# Patient Record
Sex: MALE | Race: WHITE | Employment: FULL TIME | URBAN - METROPOLITAN AREA
[De-identification: names, ages, dates, MRNs, and addresses within clinical notes are randomized per-mention and may not be internally consistent; named-entity substitution may affect disease eponyms.]

---

## 2022-02-16 NOTE — PROGRESS NOTES
HPI: Sarai Shearer (: 1975) is a 51 y.o. male, patient, here for evaluation of the following chief complaint(s):    Finger Pain (Left small finger nodule at the proximal phalanx )  Patient is seen today to evaluate his left hand. He has developed recurrent Dupuytren contracture in his left small finger. He had a palmar digital fascie[place] 3 to [place] ago. He has slowly developed recurrence and now has thickened nodule and cord formation to the ulnar aspect of the left small finger that is tenting the skin and resulted in the 45 degree PIP contracture. He has a positive tabletop test.  There really has not been any additional evidence of Dupuytren in his hand. He reportedly had an MRI performed last summer. X-rays previously obtained have not shown any bone abnormality. He complains of difficulty with functional use of his left hand and is seen for further treatment. Vitals:  Ht 5' 7\" (1.702 m)   Wt 130 lb (59 kg)   BMI 20.36 kg/m²    Body mass index is 20.36 kg/m². No Known Allergies    No current outpatient medications on file. No current facility-administered medications for this visit. History reviewed. No pertinent past medical history. History reviewed. No pertinent surgical history. History reviewed. No pertinent family history. Social History     Tobacco Use    Smoking status: Not on file    Smokeless tobacco: Not on file   Substance Use Topics    Alcohol use: Not on file    Drug use: Not on file        Review of Systems   All other systems reviewed and are negative.       ROS     Positive for: Musculoskeletal    Last edited by Ej Diaz on 2022  8:16 AM. (History)             Physical Exam    The left small finger has a PIP flexion contracture 45 degrees there is thickened pretendinous cord and nodule formation to the ulnar side of the small finger with the beginning in the palm but mostly at the proximal phalanx level crossing the PIP crease. There is a healed zigzag scar from his prior surgery when [place] was [place]. No other hand involvement. Examination of the left wrist reveals no swelling, edema or warmth, no tenderness to palpation, no pain, normal strength and tone, normal range of motion, no crepitus, normal sensation, no instability or laxity and no known fractures or deformities. Examination of the right wrist reveals no swelling, edema or warmth, no tenderness to palpation, no pain, normal strength and tone, normal range of motion, no crepitus, normal sensation, no instability or laxity and no known fractures or deformities. Examination of the right hand reveals no tenderness to palpation, no pain, normal  strength, normal tone, normal range of motion, no crepitus, no instability or laxity, no known fractures or deformities and normal sensation. Imaging:    MRI Results (most recent):  No results found for this or any previous visit. XR Results (most recent):  No results found for this or any previous visit. ASSESSMENT/PLAN:  Below is the assessment and plan developed based on review of pertinent history, physical exam, labs, studies, and medications. Patient's examination was consistent with a left small finger PIP contracture from Dupuytren cord and nodule formation more on the ulnar than radial side. This had been [place] about 3 to 5 years ago. He has developed recurrence to the ulnar side with a 45 degree flexion contracture. I spoke to him regarding treatment options and answered his questions. He was offered but deferred collagenase injection therapy and prefers to undergo revision small finger palmar digital fasciectomy with PIP joint release. I reviewed risks that include but not limited to stiffness, pain, nerve or tendon damage and incomplete complete recovery with continued contracture and recurrence.   Arrangements can be made for this to be performed on outpatient basis at his convenience. 1. Left hand pain  2. Dupuytren's contracture of left hand      Return for Follow-up 7 to 10 days after surgery. .    An electronic signature was used to authenticate this note.   -- Mai Rapp MD

## 2022-02-17 ENCOUNTER — OFFICE VISIT (OUTPATIENT)
Dept: ORTHOPEDIC SURGERY | Age: 47
End: 2022-02-17
Payer: COMMERCIAL

## 2022-02-17 VITALS — BODY MASS INDEX: 20.4 KG/M2 | WEIGHT: 130 LBS | HEIGHT: 67 IN

## 2022-02-17 DIAGNOSIS — M72.0 DUPUYTREN'S CONTRACTURE OF LEFT HAND: ICD-10-CM

## 2022-02-17 DIAGNOSIS — M79.642 LEFT HAND PAIN: Primary | ICD-10-CM

## 2022-02-17 PROCEDURE — 99203 OFFICE O/P NEW LOW 30 MIN: CPT | Performed by: ORTHOPAEDIC SURGERY

## 2022-02-17 NOTE — PATIENT INSTRUCTIONS
Dupuytren's Contracture: Care Instructions  Your Care Instructions     In Dupuytren's contracture, the fingers become stiff and curl toward the palm. It is caused by thick tissue that grows under the skin in the palm of the hand. Sometimes the condition affects the palm but not the fingers. If the tissue gets thicker and affects one or more fingers, it may limit movement of your fingers and hand. Sometimes the condition can occur in the soles of the feet. The cause of Dupuytren's disease is not known. Dupuytren's disease may get worse slowly. If you have mild Dupuytren's disease, you may be able to keep your fingers moving with regular stretching. Surgery usually helps in severe cases. However, Dupuytren's disease can come back. Follow-up care is a key part of your treatment and safety. Be sure to make and go to all appointments, and call your doctor if you are having problems. It's also a good idea to know your test results and keep a list of the medicines you take. How can you care for yourself at home? · Follow your doctor's advice for physical or occupational therapy and exercises to put your fingers and hand through a range of motion. · Two times a day, massage your hand and gently stretch the fingers back. This can get rid of tightness and help keep your fingers flexible. · Try to avoid curling your hand tightly. For example, use utensils and tools that have larger hand . When should you call for help? Call your doctor now or seek immediate medical care if:    · You have numbness in your fingers.     · You have a wound or sore on your finger or palm.     · Your hand or fingers get worse. Watch closely for changes in your health, and be sure to contact your doctor if you have any problems. Where can you learn more? Go to http://www.gray.com/  Enter T888 in the search box to learn more about \"Dupuytren's Contracture: Care Instructions. \"  Current as of: July 1, 2021               Content Version: 13.0  © 2584-5065 [employer]. Care instructions adapted under license by [employer] (which disclaims liability or warranty for this information). If you have questions about a medical condition or this instruction, always ask your healthcare professional. [address] any warranty or liability for your use of this information.

## 2022-02-21 DIAGNOSIS — M72.0 DUPUYTREN'S CONTRACTURE OF LEFT HAND: Primary | ICD-10-CM

## 2022-03-25 DIAGNOSIS — M72.0 DUPUYTREN'S CONTRACTURE OF LEFT HAND: Primary | ICD-10-CM

## 2022-03-25 RX ORDER — OXYCODONE AND ACETAMINOPHEN 5; 325 MG/1; MG/1
1 TABLET ORAL
Qty: 15 TABLET | Refills: 0 | Status: SHIPPED | OUTPATIENT
Start: 2022-03-25 | End: 2022-03-28

## 2022-04-05 NOTE — PROGRESS NOTES
HPI: Carolynn Guerrero (: 1975) is a 51 y.o. male, patient, here for evaluation of the following chief complaint(s):    Finger Pain (Left pinky feel better , somtimes gets shooting pain when sleeping , sometimes achy)  Patient is seen today to evaluate his left hand. He has developed recurrent Dupuytren contracture in his left small finger. He had a palmar digital fasciectomy performed when [place] was [place] 3 to 5 [place] ago. He has slowly developed recurrence and now has thickened nodule and cord formation to the ulnar aspect of the left small finger that is tenting the skin and resulted in the 45 degree PIP contracture. He has a positive tabletop test.  There really has not been any additional evidence of Dupuytren in his hand. He reportedly had an MRI performed last summer. X-rays previously obtained have not shown any bone abnormality. He complains of difficulty with functional use of his left hand. He underwent an left small finger palmar digital fasciectomy with PIP joint release including neurolysis repair of a chronic ulnar digital nerve injury on 3/28/2022. Vitals:  Ht 5' 7\" (1.702 m)   Wt 130 lb (59 kg)   BMI 20.36 kg/m²    Body mass index is 20.36 kg/m². No Known Allergies    No current outpatient medications on file. No current facility-administered medications for this visit. History reviewed. No pertinent past medical history. History reviewed. No pertinent surgical history. Family History   Problem Relation Age of Onset    Cancer Father         Social History     Tobacco Use    Smoking status: Former Smoker    Smokeless tobacco: Never Used   Substance Use Topics    Alcohol use: Never    Drug use: Never        Review of Systems   All other systems reviewed and are negative.              Physical Exam    The left small finger previously had a PIP flexion contracture 45 degrees there was thickened pretendinous cord and nodule formation to the ulnar side of the small finger with the beginning in the palm but mostly at the proximal phalanx level crossing the PIP crease. There is a healed zigzag scar from his prior surgery when he was [place]. No other hand involvement. The wound is healing well at this stage with no redness drainage or sign of infection. Marked improvement and near complete PIP extension with sutures in place. Even has some intact sensation ulnarly. Imaging:    MRI Results (most recent):  No results found for this or any previous visit. XR Results (most recent):  No results found for this or any previous visit. ASSESSMENT/PLAN:  Below is the assessment and plan developed based on review of pertinent history, physical exam, labs, studies, and medications. Patient's examination was consistent with a left small finger PIP contracture from Dupuytren cord and nodule formation more on the ulnar than radial side. This had been removed [place] about 3 to 5 years ago. He had developed recurrence to the ulnar side with a 45 degree flexion contracture. I spoke to him regarding treatment options and answered his questions. He was offered but deferred collagenase injection therapy and prefers to undergo revision small finger palmar digital fasciectomy with PIP joint release. He underwent the revision small finger palmar digital fasciectomy including PIP joint release and repair of a chronic ulnar digital nerve injury on 3/28/2022. He may continue with simple wound care, gentle motion and strength. A new splint was fabricated to hold the PIP joint in full extension. Overall he is doing well. I recommended nighttime extension splinting of the PIP joint and daytime motion recovery. Follow-up in 1-2 weeks for suture removal.      1. Dupuytren's contracture of left hand  -     [place] APPLY FINGER SPLINT,STATIC  -     FINGER SPLINT, STATIC  2. Left hand pain  3. Status post Dupuytren's fasciectomy      No follow-ups on file.     An electronic signature was used to authenticate this note.   -- Roberto Stacy MD

## 2022-04-06 ENCOUNTER — OFFICE VISIT (OUTPATIENT)
Dept: ORTHOPEDIC SURGERY | Age: 47
End: 2022-04-06
Payer: COMMERCIAL

## 2022-04-06 VITALS — HEIGHT: 67 IN | WEIGHT: 130 LBS | BODY MASS INDEX: 20.4 KG/M2

## 2022-04-06 DIAGNOSIS — M72.0 DUPUYTREN'S CONTRACTURE OF LEFT HAND: Primary | ICD-10-CM

## 2022-04-06 DIAGNOSIS — Z98.890 STATUS POST DUPUYTREN'S FASCIECTOMY: ICD-10-CM

## 2022-04-06 DIAGNOSIS — M79.642 LEFT HAND PAIN: ICD-10-CM

## 2022-04-06 PROCEDURE — 29130 APPL FINGER SPLINT STATIC: CPT | Performed by: ORTHOPAEDIC SURGERY

## 2022-04-06 PROCEDURE — 99024 POSTOP FOLLOW-UP VISIT: CPT | Performed by: ORTHOPAEDIC SURGERY

## 2022-04-18 NOTE — PROGRESS NOTES
HPI: Mayela Bernal (: 1975) is a 51 y.o. male, patient, here for evaluation of the following chief complaint(s):    No chief complaint on file. Patient is seen today to evaluate his left hand. He has developed recurrent Dupuytren contracture in his left small finger. He had a palmar digital fasciectomy performed when he was [place] 3 to 5 years ago. He has slowly developed recurrence and now has thickened nodule and cord formation to the ulnar aspect of the left small finger that is tenting the skin and resulted in the 45 degree PIP contracture. He has a positive tabletop test.  There really has not been any additional evidence of Dupuytren in his hand. He reportedly had an MRI performed last summer. X-rays previously obtained have not shown any bone abnormality. He complains of difficulty with functional use of his left hand. He underwent an left small finger palmar digital fasciectomy with PIP joint release including neurolysis repair of a chronic ulnar digital nerve injury on 3/28/2022. Vitals: There were no vitals taken for this visit. There is no height or weight on file to calculate BMI. No Known Allergies    No current outpatient medications on file. No current facility-administered medications for this visit. No past medical history on file. No past surgical history on file. Family History   Problem Relation Age of Onset    Cancer Father         Social History     Tobacco Use    Smoking status: Former Smoker    Smokeless tobacco: Never Used   Substance Use Topics    Alcohol use: Never    Drug use: Never        Review of Systems   All other systems reviewed and are negative.              Physical Exam    The left small finger previously had a PIP flexion contracture 45 degrees there was thickened pretendinous cord and nodule formation to the ulnar side of the small finger with the beginning in the palm but mostly at the proximal phalanx level crossing the PIP crease. There is a healed zigzag scar from his prior surgery when [place] was [place]. No other hand involvement. The wound is healing well at this stage with no redness drainage or sign of infection. Marked improvement and near complete PIP extension. Even has some intact sensation ulnarly. Imaging:    MRI Results (most recent):  No results found for this or any previous visit. XR Results (most recent):  No results found for this or any previous visit. ASSESSMENT/PLAN:  Below is the assessment and plan developed based on review of pertinent history, physical exam, labs, studies, and medications. Patient's examination was consistent with a left small finger PIP contracture from Dupuytren cord and nodule formation more on the ulnar than radial side. This had been [place] about 3 to 5 years ago. He had developed recurrence to the ulnar side with a 45 degree flexion contracture. I spoke to him regarding treatment options and answered his questions. He was offered but deferred collagenase injection therapy and prefers to undergo revision small finger palmar digital fasciectomy with PIP joint release. He underwent the revision small finger palmar digital fasciectomy including PIP joint release and repair of a chronic ulnar digital nerve injury on 3/28/2022. He may continue with simple wound care, gentle motion and strength. He continues to wear his nighttime extension splint. Sutures were removed on 4/19/2022. He may continue to work with hand motion and strength and is already within 5 degrees of full perfect PIP extension. He is pleased with his recovery and will continue with passive exercises and nighttime splinting. Follow-up in 4 to 6 weeks likely for final visit. 1. Dupuytren's contracture of left hand  2. Status post Dupuytren's fasciectomy  3. Left hand pain      No follow-ups on file. An electronic signature was used to authenticate this note.   -- Jose Carlos Lin MD

## 2022-04-19 ENCOUNTER — OFFICE VISIT (OUTPATIENT)
Dept: ORTHOPEDIC SURGERY | Age: 47
End: 2022-04-19
Payer: COMMERCIAL

## 2022-04-19 DIAGNOSIS — Z98.890 STATUS POST DUPUYTREN'S FASCIECTOMY: ICD-10-CM

## 2022-04-19 DIAGNOSIS — M72.0 DUPUYTREN'S CONTRACTURE OF LEFT HAND: Primary | ICD-10-CM

## 2022-04-19 DIAGNOSIS — M79.642 LEFT HAND PAIN: ICD-10-CM

## 2022-04-19 PROCEDURE — 99024 POSTOP FOLLOW-UP VISIT: CPT | Performed by: ORTHOPAEDIC SURGERY

## 2022-04-19 NOTE — LETTER
4/19/2022    Patient: Ava Rodrigues   YOB: 1975   Date of Visit: 4/19/2022     Love Rincon MD  [facility] [address]  Via Fax: [phone]    Dear Love Rincon MD,      Thank you for referring Mr. Ava Rodrigues to [facility] for evaluation. My notes for this consultation are attached. If you have questions, please do not hesitate to call me. I look forward to following your patient along with you.       Sincerely,    Zhou Hess MD

## 2022-05-23 NOTE — PROGRESS NOTES
HPI: Alvena Shone (: 1975) is a 51 y.o. male, patient, here for evaluation of the following chief complaint(s):    Surgical Follow-up (Left 5th finger palmar digital fasciectomy on 3/28/22.)  Patient is seen today to evaluate his left hand. He has developed recurrent Dupuytren contracture in his left small finger. He had a palmar digital fasciectomy performed when [place] was [place] 3 to 5 [place] ago. He has slowly developed recurrence and now has thickened nodule and cord formation to the ulnar aspect of the left small finger that is tenting the skin and resulted in the 45 degree PIP contracture. He has a positive tabletop test.  There really has not been any additional evidence of Dupuytren in his hand. He reportedly had an MRI performed last summer. X-rays previously obtained have not shown any bone abnormality. He complains of difficulty with functional use of his left hand. He underwent an left small finger palmar digital fasciectomy with PIP joint release including neurolysis repair of a chronic ulnar digital nerve injury on 3/28/2022. Vitals: There were no vitals taken for this visit. There is no height or weight on file to calculate BMI. No Known Allergies    Current Outpatient Medications   Medication Sig    methylPREDNISolone (MEDROL DOSEPACK) 4 mg tablet Per dose pack instructions     No current facility-administered medications for this visit. History reviewed. No pertinent past medical history. History reviewed. No pertinent surgical history. Family History   Problem Relation Age of Onset    Cancer Father         Social History     Tobacco Use    Smoking status: Former Smoker    Smokeless tobacco: Never Used   Substance Use Topics    Alcohol use: Never    Drug use: Never        Review of Systems   All other systems reviewed and are negative.       ROS     Positive for: Musculoskeletal    Last edited by Chapis Vogel on 2022  4:20 PM. (History) [employer] gold referral issued for Dr. Betancur, expires 09/28/2020.  This was completed by shaylee   Physical Exam    The left small finger previously had a PIP flexion contracture 45 degrees there was thickened pretendinous cord and nodule formation to the ulnar side of the small finger with the beginning in the palm but mostly at the proximal phalanx level crossing the PIP crease. There is a healed zigzag scar from his prior surgery when [place] was [place]. No other hand involvement. The wound is healing well at this stage with no redness drainage or sign of infection. Marked improvement and near complete PIP extension was noted initially but he is now lacking about 20 degrees of extension but nearly can be achieved passively. .  Even has some intact sensation ulnarly. Imaging:    MRI Results (most recent):  No results found for this or any previous visit. XR Results (most recent):  No results found for this or any previous visit. ASSESSMENT/PLAN:  Below is the assessment and plan developed based on review of pertinent history, physical exam, labs, studies, and medications. Patient's examination was consistent with a left small finger PIP contracture from Dupuytren cord and nodule formation more on the ulnar than radial side. [place] had [place] about 3 to 5 years ago. He had developed recurrence to the ulnar side with a 45 degree flexion contracture. I spoke to him regarding treatment options and answered his questions. He was offered but deferred collagenase injection therapy and prefers to undergo revision small finger palmar digital fasciectomy with PIP joint release. He underwent the revision small finger palmar digital fasciectomy including PIP joint release and repair of a chronic ulnar digital nerve injury on 3/28/2022. He may continue with simple wound care, gentle motion and strength. He continues to wear his nighttime extension splint. Sutures were removed on 4/19/2022.   He may continue to work with hand motion and strength and postoperatively was within 5 degrees of full perfect PIP extension. He is now within 20 degrees of full extension. I recommended a Medrol Dosepak and outpatient therapy to assist with nighttime extension splinting and even a spring splint. I will see him back in 3 to 4 weeks to check his progress. He may also work with active active assisted and passive motion exercises. 1. Dupuytren's contracture of left hand  -     methylPREDNISolone (MEDROL DOSEPACK) 4 mg tablet; Per dose pack instructions, Normal, Disp-1 Dose Pack, R-0  -     REFERRAL TO OCCUPATIONAL THERAPY  2. Status post Dupuytren's fasciectomy  3. Left hand pain      Return in about 4 weeks (around 6/21/2022). An electronic signature was used to authenticate this note.   -- Pia Subramanian MD

## 2022-05-24 ENCOUNTER — OFFICE VISIT (OUTPATIENT)
Dept: ORTHOPEDIC SURGERY | Age: 47
End: 2022-05-24
Payer: COMMERCIAL

## 2022-05-24 DIAGNOSIS — M79.642 LEFT HAND PAIN: ICD-10-CM

## 2022-05-24 DIAGNOSIS — M72.0 DUPUYTREN'S CONTRACTURE OF LEFT HAND: Primary | ICD-10-CM

## 2022-05-24 DIAGNOSIS — Z98.890 STATUS POST DUPUYTREN'S FASCIECTOMY: ICD-10-CM

## 2022-05-24 PROCEDURE — 99024 POSTOP FOLLOW-UP VISIT: CPT | Performed by: ORTHOPAEDIC SURGERY

## 2022-05-24 RX ORDER — METHYLPREDNISOLONE 4 MG/1
TABLET ORAL
Qty: 1 DOSE PACK | Refills: 0 | Status: SHIPPED | OUTPATIENT
Start: 2022-05-24

## 2022-05-24 NOTE — LETTER
5/24/2022    Patient: Miah Luna   YOB: 1975   Date of Visit: 5/24/2022     Annabel Bamberger, MD  [address] [place] [id number]  Via Fax: [phone]    Dear Annabel Bamberger, MD,      Thank you for referring Mr. Miah Luna to [facility] for evaluation. My notes for this consultation are attached. If you have questions, please do not hesitate to call me. I look forward to following your patient along with you.       Sincerely,    Jonathan Bender MD

## 2023-04-05 NOTE — PROGRESS NOTES
HPI: Chadd Hairston (: 1975) is a 49 y.o. male, patient, here for evaluation of the following chief complaint(s):    No chief complaint on file. Patient is seen today to evaluate his left hand. He has developed recurrent Dupuytren contracture in his left small finger. He had [place] palmar digital fascie[place]. He has slowly developed recurrence and now has thickened nodule and cord formation to the ulnar aspect of the left small finger that is tenting the skin and resulted in the 45 degree PIP contracture. He has a positive tabletop test.  There really has not been any additional evidence of Dupuytren in his hand. He reportedly had an MRI performed last summer. X-rays previously obtained have not shown any bone abnormality. He complains of difficulty with functional use of his left hand. He underwent an left small finger palmar digital fasciectomy with PIP joint release including neurolysis repair of a chronic ulnar digital nerve injury on 3/28/2022. Patient was reevaluated on 2023. He has been able to maintain the extension of his left small finger. He continues to wear the splint at night and during the day will wear a boutonniere splint 2-3 times during the day. He is within about 5 degrees of full PIP extension. Proximal to the scar in the palm he has developed new Dupuytren disease with thickened nodule and cord formation about 2 cm in length. He states he still able to perform his activities. Vitals: There were no vitals taken for this visit. There is no height or weight on file to calculate BMI. No Known Allergies    Current Outpatient Medications   Medication Sig    methylPREDNISolone (MEDROL DOSEPACK) 4 mg tablet Per dose pack instructions     No current facility-administered medications for this visit. No past medical history on file. No past surgical history on file.     Family History   Problem Relation Age of Onset Cancer Father         Social History     Tobacco Use    Smoking status: Former    Smokeless tobacco: Never   Substance Use Topics    Alcohol use: Never    Drug use: Never        Review of Systems   All other systems reviewed and are negative. Physical Exam    The left small finger previously had a PIP flexion contracture 45 degrees there was thickened pretendinous cord and nodule formation to the ulnar side of the small finger with the beginning in the palm but mostly at the proximal phalanx level crossing the PIP crease. There is a healed zigzag scar from his prior surgery when [place] was [place]. No other hand involvement. Postoperatively: The scar healed very nicely to the small finger the PIP motion is -5 to 95 degrees. When reassessed on 4/6/2023 he did have a 2 cm by half centimeter area of new palmar disease proximal to the scar to the hypothenar side of the hand. He has a negative tabletop test.  Imaging:    MRI Results (most recent):  No results found for this or any previous visit. XR Results (most recent):  No results found for this or any previous visit. ASSESSMENT/PLAN:  Below is the assessment and plan developed based on review of pertinent history, physical exam, labs, studies, and medications. Patient's examination was consistent with a left small finger PIP contracture from Dupuytren cord and nodule formation more on the ulnar than radial side. This had been [place] about 3 to 5 years ago. He had developed recurrence to the ulnar side with a 45 degree flexion contracture. I spoke to him regarding treatment options and answered his questions. He was offered but deferred collagenase injection therapy and prefers to undergo revision small finger palmar digital fasciectomy with PIP joint release. He underwent the revision small finger palmar digital fasciectomy including PIP joint release and repair of a chronic ulnar digital nerve injury on 3/28/2022.   He may continue with simple wound care, gentle motion and strength. He continues to wear his nighttime extension splint. Sutures were removed on 4/19/2022. He has been able to maintain left small finger PIP motion -5 to 95 degrees when still occasionally wearing the splint during the day and even at night. He has developed some newer disease in the proximal hypothenar palm to the small finger right above the scar 2 by half centimeter in size when reevaluated on 4/6/2023. He wants to observe for now but he certainly understands future consideration can be given for limited palmar fasciectomy. I plan to see him back in 3 to 4 months to see if there is any progression. 1. Dupuytren's contracture of left hand  2. Status post Dupuytren's fasciectomy  3. Left hand pain      No follow-ups on file. An electronic signature was used to authenticate this note.   -- Tamiko Miramontes MD

## 2023-04-06 ENCOUNTER — OFFICE VISIT (OUTPATIENT)
Dept: ORTHOPEDIC SURGERY | Age: 48
End: 2023-04-06
Payer: COMMERCIAL

## 2023-04-06 PROCEDURE — 99213 OFFICE O/P EST LOW 20 MIN: CPT | Performed by: ORTHOPAEDIC SURGERY

## 2023-04-06 NOTE — LETTER
4/6/2023    Patient: Krish Xavier   YOB: 1975   Date of Visit: 4/6/2023     Sayra Mora MD  [facility] [address] [address] [address]  Via Fax: [phone]    Dear Sayra Mora MD,      Thank you for referring Mr. Krish Xavier to [facility] for evaluation. My notes for this consultation are attached. If you have questions, please do not hesitate to call me. I look forward to following your patient along with you.       Sincerely,    Arelis Mathew MD
